# Patient Record
Sex: MALE | Race: WHITE | NOT HISPANIC OR LATINO
[De-identification: names, ages, dates, MRNs, and addresses within clinical notes are randomized per-mention and may not be internally consistent; named-entity substitution may affect disease eponyms.]

---

## 2018-08-07 ENCOUNTER — APPOINTMENT (OUTPATIENT)
Dept: GASTROENTEROLOGY | Facility: CLINIC | Age: 38
End: 2018-08-07
Payer: COMMERCIAL

## 2018-08-07 VITALS
SYSTOLIC BLOOD PRESSURE: 90 MMHG | TEMPERATURE: 98.1 F | DIASTOLIC BLOOD PRESSURE: 65 MMHG | BODY MASS INDEX: 17.58 KG/M2 | HEIGHT: 64 IN | WEIGHT: 103 LBS

## 2018-08-07 PROCEDURE — 99204 OFFICE O/P NEW MOD 45 MIN: CPT

## 2018-10-03 ENCOUNTER — APPOINTMENT (OUTPATIENT)
Dept: GASTROENTEROLOGY | Facility: CLINIC | Age: 38
End: 2018-10-03
Payer: COMMERCIAL

## 2018-10-03 VITALS
SYSTOLIC BLOOD PRESSURE: 110 MMHG | DIASTOLIC BLOOD PRESSURE: 70 MMHG | TEMPERATURE: 97.8 F | WEIGHT: 103 LBS | BODY MASS INDEX: 17.58 KG/M2 | HEIGHT: 64 IN

## 2018-10-03 DIAGNOSIS — R19.7 DIARRHEA, UNSPECIFIED: ICD-10-CM

## 2018-10-03 DIAGNOSIS — R10.9 UNSPECIFIED ABDOMINAL PAIN: ICD-10-CM

## 2018-10-03 PROCEDURE — 99214 OFFICE O/P EST MOD 30 MIN: CPT

## 2018-12-05 ENCOUNTER — APPOINTMENT (OUTPATIENT)
Dept: GASTROENTEROLOGY | Facility: CLINIC | Age: 38
End: 2018-12-05

## 2020-01-06 NOTE — REASON FOR VISIT
[New Patient Visit] : a new patient visit [Referred By: _________] : Patient was referred by JENNIFER

## 2020-01-07 ENCOUNTER — APPOINTMENT (OUTPATIENT)
Dept: SPINE | Facility: CLINIC | Age: 40
End: 2020-01-07

## 2020-08-17 ENCOUNTER — TRANSCRIPTION ENCOUNTER (OUTPATIENT)
Age: 40
End: 2020-08-17

## 2021-02-08 ENCOUNTER — NON-APPOINTMENT (OUTPATIENT)
Age: 41
End: 2021-02-08

## 2021-02-16 ENCOUNTER — NON-APPOINTMENT (OUTPATIENT)
Age: 41
End: 2021-02-16

## 2021-02-16 ENCOUNTER — APPOINTMENT (OUTPATIENT)
Dept: INTERNAL MEDICINE | Facility: CLINIC | Age: 41
End: 2021-02-16
Payer: COMMERCIAL

## 2021-02-16 VITALS
OXYGEN SATURATION: 97 % | SYSTOLIC BLOOD PRESSURE: 135 MMHG | BODY MASS INDEX: 19.97 KG/M2 | DIASTOLIC BLOOD PRESSURE: 86 MMHG | HEART RATE: 113 BPM | WEIGHT: 117 LBS | HEIGHT: 64 IN | TEMPERATURE: 97.5 F

## 2021-02-16 DIAGNOSIS — J98.19 OTHER PULMONARY COLLAPSE: ICD-10-CM

## 2021-02-16 PROCEDURE — 99072 ADDL SUPL MATRL&STAF TM PHE: CPT

## 2021-02-16 PROCEDURE — 99385 PREV VISIT NEW AGE 18-39: CPT

## 2021-02-16 RX ORDER — LIDOCAINE HYDROCHLORIDE 20 MG/ML
2 JELLY TOPICAL
Refills: 0 | Status: ACTIVE | COMMUNITY

## 2021-02-16 RX ORDER — ACETAMINOPHEN 325 MG/1
TABLET, FILM COATED ORAL
Refills: 0 | Status: ACTIVE | COMMUNITY

## 2021-02-16 RX ORDER — IMATINIB MESYLATE 400 MG/1
400 TABLET, FILM COATED ORAL
Refills: 0 | Status: ACTIVE | COMMUNITY

## 2021-02-17 NOTE — REVIEW OF SYSTEMS
[Dyspnea on Exertion] : dyspnea on exertion [Muscle Weakness] : muscle weakness [Unsteady Walk] : ataxia [Negative] : Genitourinary

## 2021-02-17 NOTE — PHYSICAL EXAM
[Cachectic] : cachexia was observed [Normal] : normal rate, regular rhythm, normal S1 and S2 and no murmur heard [No Varicosities] : no varicosities [Pedal Pulses Present] : the pedal pulses are present [No Edema] : there was no peripheral edema [No Extremity Clubbing/Cyanosis] : no extremity clubbing/cyanosis [Soft] : abdomen soft [Non Tender] : non-tender [Non-distended] : non-distended [Normal Bowel Sounds] : normal bowel sounds [Normal Posterior Cervical Nodes] : no posterior cervical lymphadenopathy [Normal Anterior Cervical Nodes] : no anterior cervical lymphadenopathy [No Rash] : no rash [Normal Affect] : the affect was normal [Normal Mood] : the mood was normal [de-identified] : Seen in wheelchair [de-identified] : Lordotic [de-identified] : Increased muscle atrophy noted in all extremities [de-identified] : Weakness appreciated on right lower extremity

## 2021-02-17 NOTE — ASSESSMENT
[FreeTextEntry1] : 40 year old male presents for initial annual exam.  \par Patient has extensive past medical history.\par \par Was diagnosed with severe scoliosis as a child which required surgery.  Was doing well until recently screws and back became loose and had to be revised for also further complication of developing diaphragmatic paralysis, restrictive lung disease.  recently had another revision with Dr. Gibbs, ortho spine.  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  recently has developed clonus in right leg.  Decreased strength, which she has been using wheelchair to get around.  \par -Advised to reach out to orthopedic surgery who performed the procedure, if no avail would recommend following up with neurosurgery specializes in spine for second opinion\par -Continue following up with pain management\par -Continue PT\par \par Advised to have specialist fax over old records to review\par \par restrictive lung disease, home o2 at night, Bipap, pulmonologist Dr. Neymar Chavez\par \par Annual \par -Advised to get yearly Flu shot \par -Advised Yearly Eye exam with Ophthalmologist\par -Advised Yearly Dental exam\par -Educated of the importance of Healthy diet, such as Mediterranean Diet and Exercise, such as walking >20 minutes a day and increasing gradually as tolerated\par

## 2021-02-17 NOTE — HEALTH RISK ASSESSMENT
[No] : No [No falls in past year] : Patient reported no falls in the past year [0] : 2) Feeling down, depressed, or hopeless: Not at all (0) [] : No [[id number]] : 0

## 2021-06-09 ENCOUNTER — APPOINTMENT (OUTPATIENT)
Dept: SPINE | Facility: CLINIC | Age: 41
End: 2021-06-09
Payer: COMMERCIAL

## 2021-06-09 VITALS
SYSTOLIC BLOOD PRESSURE: 111 MMHG | HEIGHT: 64 IN | RESPIRATION RATE: 18 BRPM | OXYGEN SATURATION: 97 % | WEIGHT: 115 LBS | BODY MASS INDEX: 19.63 KG/M2 | HEART RATE: 91 BPM | TEMPERATURE: 97.7 F | DIASTOLIC BLOOD PRESSURE: 75 MMHG

## 2021-06-09 DIAGNOSIS — Z98.890 OTHER SPECIFIED POSTPROCEDURAL STATES: ICD-10-CM

## 2021-06-09 PROCEDURE — 99205 OFFICE O/P NEW HI 60 MIN: CPT

## 2021-06-09 PROCEDURE — 99072 ADDL SUPL MATRL&STAF TM PHE: CPT

## 2021-10-22 ENCOUNTER — APPOINTMENT (OUTPATIENT)
Dept: INTERNAL MEDICINE | Facility: CLINIC | Age: 41
End: 2021-10-22
Payer: COMMERCIAL

## 2021-10-22 ENCOUNTER — LABORATORY RESULT (OUTPATIENT)
Age: 41
End: 2021-10-22

## 2021-10-22 VITALS
HEIGHT: 64 IN | OXYGEN SATURATION: 97 % | DIASTOLIC BLOOD PRESSURE: 79 MMHG | SYSTOLIC BLOOD PRESSURE: 112 MMHG | WEIGHT: 114 LBS | BODY MASS INDEX: 19.46 KG/M2 | HEART RATE: 99 BPM

## 2021-10-22 PROCEDURE — 99213 OFFICE O/P EST LOW 20 MIN: CPT

## 2021-10-22 RX ORDER — GABAPENTIN 400 MG
400 TABLET ORAL
Refills: 0 | Status: DISCONTINUED | COMMUNITY
End: 2021-10-22

## 2021-10-22 RX ORDER — DICYCLOMINE HYDROCHLORIDE 10 MG/1
10 CAPSULE ORAL EVERY 6 HOURS
Qty: 120 | Refills: 0 | Status: DISCONTINUED | COMMUNITY
Start: 2018-08-07 | End: 2021-10-22

## 2021-10-25 LAB
ALBUMIN SERPL ELPH-MCNC: 4.6 G/DL
ALP BLD-CCNC: 105 U/L
ALT SERPL-CCNC: 27 U/L
ANION GAP SERPL CALC-SCNC: 12 MMOL/L
AST SERPL-CCNC: 17 U/L
BASOPHILS # BLD AUTO: 0.01 K/UL
BASOPHILS NFR BLD AUTO: 0.1 %
BILIRUB DIRECT SERPL-MCNC: 0.1 MG/DL
BILIRUB INDIRECT SERPL-MCNC: 0.2 MG/DL
BILIRUB SERPL-MCNC: 0.3 MG/DL
BUN SERPL-MCNC: 13 MG/DL
CALCIUM SERPL-MCNC: 10.1 MG/DL
CHLORIDE SERPL-SCNC: 96 MMOL/L
CHOLEST SERPL-MCNC: 195 MG/DL
CO2 SERPL-SCNC: 32 MMOL/L
CREAT SERPL-MCNC: 0.61 MG/DL
EOSINOPHIL # BLD AUTO: 0 K/UL
EOSINOPHIL NFR BLD AUTO: 0 %
ESTIMATED AVERAGE GLUCOSE: 108 MG/DL
FOLATE SERPL-MCNC: 14 NG/ML
GLUCOSE SERPL-MCNC: 116 MG/DL
HBA1C MFR BLD HPLC: 5.4 %
HCT VFR BLD CALC: 44.3 %
HDLC SERPL-MCNC: 46 MG/DL
HGB BLD-MCNC: 14.2 G/DL
IMM GRANULOCYTES NFR BLD AUTO: 0.3 %
LDLC SERPL CALC-MCNC: 136 MG/DL
LYMPHOCYTES # BLD AUTO: 0.69 K/UL
LYMPHOCYTES NFR BLD AUTO: 9.7 %
MAN DIFF?: NORMAL
MCHC RBC-ENTMCNC: 31.1 PG
MCHC RBC-ENTMCNC: 32.1 GM/DL
MCV RBC AUTO: 97.1 FL
MONOCYTES # BLD AUTO: 0.46 K/UL
MONOCYTES NFR BLD AUTO: 6.5 %
NEUTROPHILS # BLD AUTO: 5.93 K/UL
NEUTROPHILS NFR BLD AUTO: 83.4 %
NONHDLC SERPL-MCNC: 150 MG/DL
PHOSPHATE SERPL-MCNC: 3.5 MG/DL
PLATELET # BLD AUTO: 351 K/UL
POTASSIUM SERPL-SCNC: 4.5 MMOL/L
PROT SERPL-MCNC: 6.4 G/DL
RBC # BLD: 4.56 M/UL
RBC # FLD: 14.4 %
SODIUM SERPL-SCNC: 140 MMOL/L
T3FREE SERPL-MCNC: 3.84 PG/ML
T4 FREE SERPL-MCNC: 1.2 NG/DL
TRIGL SERPL-MCNC: 68 MG/DL
TSH SERPL-ACNC: 0.82 UIU/ML
VIT B12 SERPL-MCNC: 437 PG/ML
WBC # FLD AUTO: 7.11 K/UL

## 2021-10-25 NOTE — PHYSICAL EXAM
[Cachectic] : cachexia was observed [Normal] : normal rate, regular rhythm, normal S1 and S2 and no murmur heard [No Varicosities] : no varicosities [Pedal Pulses Present] : the pedal pulses are present [No Edema] : there was no peripheral edema [No Extremity Clubbing/Cyanosis] : no extremity clubbing/cyanosis [Soft] : abdomen soft [Non Tender] : non-tender [Non-distended] : non-distended [Normal Bowel Sounds] : normal bowel sounds [Normal Posterior Cervical Nodes] : no posterior cervical lymphadenopathy [Normal Anterior Cervical Nodes] : no anterior cervical lymphadenopathy [No Rash] : no rash [Normal Affect] : the affect was normal [Normal Mood] : the mood was normal [de-identified] : Seen in wheelchair [de-identified] : Lordotic [de-identified] : Increased muscle atrophy noted in all extremities [de-identified] : Weakness appreciated on right lower extremity

## 2021-10-25 NOTE — HISTORY OF PRESENT ILLNESS
[de-identified] : 40 year old male presents for follow-up. accompanied with father \par \par interval HPI: still having acute pain and spasms, somewhat bettyer with lyrica.  spasms worsen off lyrica.  pain management recommended possible releif from medical marijuana.  \par \par HPI: Patient has extensive past medical history.\par Was diagnosed with severe scoliosis as a child which required surgery.  Was doing well until recently screws and back became loose and had to be revised for also further complication of developing diaphragmatic paralysis, restrictive lung disease.  recently had another revision with Dr. Gibbs, ortho spine.  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  \par recently has developed clonus in right leg.  Decreased strength, which she has been using wheelchair to get around.  \par \par restrictive lung disease, home o2 at night, Bipap \par pulmonologist Dr. Neymar Chavez

## 2021-10-25 NOTE — ASSESSMENT
[FreeTextEntry1] : 40 year old male presents for initial annual exam.  \par Patient has extensive past medical history.\par \par Was diagnosed with severe scoliosis as a child which required surgery.  Was doing well until recently screws and back became loose and had to be revised for also further complication of developing diaphragmatic paralysis, restrictive lung disease.  recently had another revision with Dr. Gibbs, ortho spine.  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  recently has developed clonus in right leg.  Decreased strength, which she has been using wheelchair to get around.  \par -Advised to reach out to orthopedic surgery who performed the procedure, if no avail would recommend following up with neurosurgery specializes in spine for second opinion\par -Continue following up with pain management\par -Continue PT\par -will certify fo medical marijuana and monitor for symptoms, risks and benefits of medication discussed in detail\par \par \par restrictive lung disease, home o2 at night, Bipap, pulmonologist Dr. Neymar Chavez-stable\par \par \par -Advised to get yearly Flu shot -refused today \par \par

## 2021-11-12 ENCOUNTER — APPOINTMENT (OUTPATIENT)
Dept: INTERNAL MEDICINE | Facility: CLINIC | Age: 41
End: 2021-11-12
Payer: COMMERCIAL

## 2021-11-12 DIAGNOSIS — R25.8 OTHER ABNORMAL INVOLUNTARY MOVEMENTS: ICD-10-CM

## 2021-11-12 PROCEDURE — 99213 OFFICE O/P EST LOW 20 MIN: CPT | Mod: 95

## 2021-11-12 NOTE — PHYSICAL EXAM
[de-identified] : Limited due to telehealth.  Speaking in complete sentences in no acute distress

## 2021-11-12 NOTE — HISTORY OF PRESENT ILLNESS
[Home] : at home, [unfilled] , at the time of the visit. [Medical Office: ([facility])___] : at the medical office located in  [Verbal consent obtained from patient] : the patient, [unfilled] [de-identified] : 40 year old male presents for follow-up.\par \par interval HPI: states is doing better with decreased dose of lyrica but would like to try lower dose as was taking 75 mg twice daily was getting some spasms in the middle of the day.  Has started CBD oil with some relief, concerned taking it systemically as can increase concentration of chemotherapy\par \par HPI: Patient has extensive past medical history.\par Was diagnosed with severe scoliosis as a child which required surgery.  Was doing well until recently screws and back became loose and had to be revised for also further complication of developing diaphragmatic paralysis, restrictive lung disease.  recently had another revision with Dr. Gibbs, ortho spine.  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  \par recently has developed clonus in right leg.  Decreased strength, which she has been using wheelchair to get around.  \par \par restrictive lung disease, home o2 at night, Bipap \par pulmonologist Dr. Neymar Chavez

## 2021-11-12 NOTE — ASSESSMENT
[FreeTextEntry1] : 40 year old male presents for follow-up \par Patient has extensive past medical history.\par \par Was diagnosed with severe scoliosis as a child which required surgery.  Was doing well until recently screws and back became loose and had to be revised for also further complication of developing diaphragmatic paralysis, restrictive lung disease.  recently had another revision with Dr. Gibbs, ortho spine.  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  recently has developed clonus in right leg.  Decreased strength, which she has been using wheelchair to get around.  \par -We will try Lyrica 50 mg 3 times daily and monitor for response\par -cont medical marijuana and monitor for symptoms, advised to discuss with [facility] regarding possible systemic ingestion as can have better effect on muscle spasms.\par \par \par restrictive lung disease, home o2 at night, Bipap, pulmonologist Dr. Neymar Chavez-stable\par \par \par -Advised to get yearly Flu shot -refused today \par \par

## 2022-02-14 ENCOUNTER — APPOINTMENT (OUTPATIENT)
Dept: INTERNAL MEDICINE | Facility: CLINIC | Age: 42
End: 2022-02-14
Payer: COMMERCIAL

## 2022-02-14 PROCEDURE — 99213 OFFICE O/P EST LOW 20 MIN: CPT | Mod: 95

## 2022-02-14 RX ORDER — PREGABALIN 50 MG/1
50 CAPSULE ORAL
Qty: 270 | Refills: 1 | Status: ACTIVE | COMMUNITY
Start: 1900-01-01 | End: 1900-01-01

## 2022-02-14 NOTE — PHYSICAL EXAM
[de-identified] : Limited due to telehealth.  Speaking in complete sentences in no acute distress
The patient is a 58y Male complaining of sinus congestion/pain.

## 2022-02-14 NOTE — HISTORY OF PRESENT ILLNESS
[de-identified] : 41 year old male presents for follow-up.\par \par interval HPI: Requesting refill for Lyrica. Has started CBD oil with some relief, concerned taking it systemically as can increase concentration of chemotherapy\par \par HPI: Patient has extensive past medical history.  Never\par Was diagnosed with severe scoliosis as a child which required surgery.  Was doing well until recently screws and back became loose and had to be revised for also further complication of developing diaphragmatic paralysis, restrictive lung disease.  recently had another revision with Dr. Gibbs, ortho spine.  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  \par recently has developed clonus in right leg.  Decreased strength, which she has been using wheelchair to get around.  \par \par restrictive lung disease, home o2 at night, Bipap \par pulmonologist Dr. Neymar Chavez

## 2022-02-14 NOTE — ASSESSMENT
[FreeTextEntry1] : 40 year old male presents for follow-up \par Patient has extensive past medical history.\par \par Was diagnosed with severe scoliosis as a child which required surgery.  Was doing well until recently screws and back became loose and had to be revised for also further complication of developing diaphragmatic paralysis, restrictive lung disease.  recently had another revision with Dr. Gibbs, ortho spine.  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  recently has developed clonus in right leg.  Decreased strength, which she has been using wheelchair to get around.  \par -We will try Lyrica 50 mg 3 times daily and monitor for response\par -cont medical marijuana and monitor for symptoms, advised to discuss with [facility] regarding possible systemic ingestion as can have better effect on muscle spasms.\par \par \par restrictive lung disease, home o2 at night, Bipap, pulmonologist Dr. Neymar Chavez-stable\par \par \par -Advised to get yearly Flu shot -refused today \par \par

## 2023-01-09 ENCOUNTER — APPOINTMENT (OUTPATIENT)
Dept: INTERNAL MEDICINE | Facility: CLINIC | Age: 43
End: 2023-01-09
Payer: COMMERCIAL

## 2023-01-09 ENCOUNTER — APPOINTMENT (OUTPATIENT)
Dept: INTERNAL MEDICINE | Facility: CLINIC | Age: 43
End: 2023-01-09

## 2023-01-09 VITALS
HEART RATE: 108 BPM | HEIGHT: 64 IN | BODY MASS INDEX: 18.44 KG/M2 | DIASTOLIC BLOOD PRESSURE: 73 MMHG | WEIGHT: 108 LBS | SYSTOLIC BLOOD PRESSURE: 117 MMHG

## 2023-01-09 DIAGNOSIS — M54.6 PAIN IN THORACIC SPINE: ICD-10-CM

## 2023-01-09 DIAGNOSIS — M54.9 DORSALGIA, UNSPECIFIED: ICD-10-CM

## 2023-01-09 DIAGNOSIS — G89.29 DORSALGIA, UNSPECIFIED: ICD-10-CM

## 2023-01-09 DIAGNOSIS — M41.9 SCOLIOSIS, UNSPECIFIED: ICD-10-CM

## 2023-01-09 PROCEDURE — 99214 OFFICE O/P EST MOD 30 MIN: CPT

## 2023-01-09 NOTE — PHYSICAL EXAM
[Cachectic] : cachexia was observed [Normal] : normal rate, regular rhythm, normal S1 and S2 and no murmur heard [No Varicosities] : no varicosities [Pedal Pulses Present] : the pedal pulses are present [No Edema] : there was no peripheral edema [No Extremity Clubbing/Cyanosis] : no extremity clubbing/cyanosis [Soft] : abdomen soft [Non Tender] : non-tender [Non-distended] : non-distended [Normal Bowel Sounds] : normal bowel sounds [Normal Posterior Cervical Nodes] : no posterior cervical lymphadenopathy [Normal Anterior Cervical Nodes] : no anterior cervical lymphadenopathy [No Rash] : no rash [Normal Affect] : the affect was normal [Normal Mood] : the mood was normal [de-identified] : Seen in wheelchair [de-identified] : Lordotic [de-identified] : Increased muscle atrophy noted in all extremities [de-identified] : Weakness appreciated on right lower extremity

## 2023-01-09 NOTE — REVIEW OF SYSTEMS
[Muscle Weakness] : muscle weakness [Skin Rash] : skin rash [Unsteady Walk] : ataxia [Negative] : Genitourinary

## 2023-01-09 NOTE — ASSESSMENT
[FreeTextEntry1] : 42 year old male presents for follow-up \par Patient has extensive past medical history.\par \par abnormal urine, u/a-wnl.  can be rleated to passing stone \par -General recommendations include fluid intake, mainly water, to produce about 2.5 liters of urine per day. Calcium intake should be approximately 1000 mg per day. Oxalate intake should be minimized. Oxalate rich foods include peanuts, nuts, tea, coffee, chocolate, spinach, beets, rhubarb, swiss chard, etc. Salt intake should be limited to less than 2000 mg per day. High levels of salt in diet will increase urinary calcium. Citrate should be increased with bhupendra and limes or adding concentrate to water. \par \par \par Was diagnosed with severe scoliosis as a child which required surgery.  Was doing well until recently screws and back became loose and had to be revised for also further complication of developing diaphragmatic paralysis, restrictive lung disease.  recently had another revision with Dr. Gibbs, ortho spine.  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  recently has developed clonus in right leg.  Decreased strength, which she has been using wheelchair to get around.  \par -f/u as per [facility] \par \par chronic back pain \par -recommended acupuncture, cont PT with aqua \par \par restrictive lung disease, home o2 at night, Bipap, pulmonologist Dr. Neymar Chavez-stable\par \par -Advised to get yearly Flu shot -refused today \par \par f/u for annual later this month \par \par Please note that 36 minutes time spent in care with this patient which includes pre-visit preparation such as reviewed pertinent labs, imaging, and specialists consultation, in-person visit, post-visit documentation and discussion.\par

## 2023-01-09 NOTE — HISTORY OF PRESENT ILLNESS
[de-identified] : 42 year old male presents for follow-up.\par \par interval HPI: \par going to aqua therapy which helps somewhat with back pain \par follows with pain management \par kidney lesion noted on CT s/p bx 6/2022.  bx was unsatisfactory.  f/u CT shows smaller in size.   \par urinalysis shows some protein.  was seen at [facility] and labs and urinalysis was normal last week.  \par \par \par HPI: Patient has extensive past medical history.  Never\par Was diagnosed with severe scoliosis as a child which required surgery.  Was doing well until recently screws and back became loose and had to be revised for also further complication of developing diaphragmatic paralysis, restrictive lung disease.  recently had another revision with Dr. Gibbs, ortho spine.  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  \par recently has developed clonus in right leg.  Decreased strength, which she has been using wheelchair to get around.  \par \par restrictive lung disease, home o2 at night, Bipap \par pulmonologist Dr. Neymar Chavez

## 2023-01-10 ENCOUNTER — CLINICAL ADVICE (OUTPATIENT)
Age: 43
End: 2023-01-10

## 2023-01-10 LAB
APPEARANCE: CLEAR
BACTERIA: NEGATIVE
BILIRUBIN URINE: NEGATIVE
BLOOD URINE: NEGATIVE
COLOR: YELLOW
GLUCOSE QUALITATIVE U: NEGATIVE
HYALINE CASTS: 1 /LPF
KETONES URINE: NEGATIVE
LEUKOCYTE ESTERASE URINE: NEGATIVE
MICROSCOPIC-UA: NORMAL
NITRITE URINE: NEGATIVE
PH URINE: 7.5
PROTEIN URINE: ABNORMAL
RED BLOOD CELLS URINE: 8 /HPF
SPECIFIC GRAVITY URINE: 1.02
SQUAMOUS EPITHELIAL CELLS: 1 /HPF
UROBILINOGEN URINE: ABNORMAL
WHITE BLOOD CELLS URINE: 1 /HPF

## 2023-01-24 ENCOUNTER — LABORATORY RESULT (OUTPATIENT)
Age: 43
End: 2023-01-24

## 2023-01-24 ENCOUNTER — NON-APPOINTMENT (OUTPATIENT)
Age: 43
End: 2023-01-24

## 2023-01-24 ENCOUNTER — APPOINTMENT (OUTPATIENT)
Dept: INTERNAL MEDICINE | Facility: CLINIC | Age: 43
End: 2023-01-24
Payer: MEDICARE

## 2023-01-24 VITALS
WEIGHT: 106 LBS | SYSTOLIC BLOOD PRESSURE: 126 MMHG | HEIGHT: 64 IN | BODY MASS INDEX: 18.1 KG/M2 | DIASTOLIC BLOOD PRESSURE: 81 MMHG | HEART RATE: 96 BPM

## 2023-01-24 PROCEDURE — 36415 COLL VENOUS BLD VENIPUNCTURE: CPT

## 2023-01-24 PROCEDURE — 93000 ELECTROCARDIOGRAM COMPLETE: CPT | Mod: 59

## 2023-01-24 PROCEDURE — G0403: CPT

## 2023-01-24 PROCEDURE — G0402 INITIAL PREVENTIVE EXAM: CPT

## 2023-01-24 PROCEDURE — G0439: CPT

## 2023-01-24 NOTE — HISTORY OF PRESENT ILLNESS
[de-identified] : 42 year old male in wheelchair with h/o h/o multiple back surgeries, GIST tumor, restrictive lung disease presents for annual exam\par \par overall doing ok.  no further hematuria \par \par 1/9/2023-\par going to aqua therapy which helps somewhat with back pain \par follows with pain management \par kidney lesion noted on CT s/p bx 6/2022.  bx was unsatisfactory.  f/u CT shows smaller in size.   \par urinalysis shows some protein.  [facility] seen at [facility] and labs and urinalysis was normal last week.  \par \par \par HPI: Patient has extensive past medical history.  \par Was diagnosed with severe scoliosis as a child which required surgery.  Was doing well until recently screws and back became loose and had to be revised for also further complication of developing diaphragmatic paralysis, restrictive lung disease.  recently had another revision with Dr. Gibbs, ortho spine.  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  \par recently has developed clonus in right leg.  Decreased strength, which she has been using wheelchair to get around.  \par \par restrictive lung disease, home o2 at night, Bipap \par pulmonologist Dr. Neymar Chavez

## 2023-01-24 NOTE — ASSESSMENT
[FreeTextEntry1] : 42 year old male presents for annual exam \par Patient has extensive past medical history.\par \par abnormal urine, u/a-wnl.  can be related to passing stone -resolved \par -General recommendations include fluid intake, mainly water, to produce about 2.5 liters of urine per day. Calcium intake should be approximately 1000 mg per day. Oxalate intake should be minimized. Oxalate rich foods include peanuts, nuts, tea, coffee, chocolate, spinach, beets, rhubarb, swiss chard, etc. Salt intake should be limited to less than 2000 mg per day. High levels of salt in diet will increase urinary calcium. Citrate should be increased with bhupendra and limes or adding concentrate to water. \par \par \par Was diagnosed with severe scoliosis as a child which required surgery.  Was doing well until recently screws and back became loose and had to be revised for also further complication of developing diaphragmatic paralysis, restrictive lung disease.  recently had another revision with Dr. Gibbs, ortho spine.  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  recently has developed clonus in right leg.  Decreased strength, which she has been using wheelchair to get around.  \par -f/u as per [facility] \par \par chronic back pain \par -recommended acupuncture, cont PT with aqua \par \par restrictive lung disease, home o2 at night, Bipap, pulmonologist Dr. Neymar Chavez-stable\par \par Annual \par -Advise to get yearly Flu shot-refusing \par -Advise Yearly Skin cancer screening with Dermatologist \par -Advise Yearly Eye exam with Ophthalmologist\par -Advise Yearly Dental exam\par -Educated of the importance of Healthy diet, such as Mediterranean Diet and Exercise, such as walking >20 minutes a day and increasing gradually as tolerated\par \par \par \par \par

## 2023-01-24 NOTE — HEALTH RISK ASSESSMENT
[Fair] :  ~his/her~ mood as fair [Never] : Never [No falls in past year] : Patient reported no falls in the past year [0] : 2) Feeling down, depressed, or hopeless: Not at all (0) [PHQ-2 Negative - No further assessment needed] : PHQ-2 Negative - No further assessment needed [[id number]] : 0 [Change in mental status noted] : No change in mental status noted

## 2023-01-24 NOTE — PHYSICAL EXAM
[Cachectic] : cachexia was observed [Normal] : normal rate, regular rhythm, normal S1 and S2 and no murmur heard [No Varicosities] : no varicosities [Pedal Pulses Present] : the pedal pulses are present [No Edema] : there was no peripheral edema [No Extremity Clubbing/Cyanosis] : no extremity clubbing/cyanosis [Soft] : abdomen soft [Non Tender] : non-tender [Non-distended] : non-distended [Normal Bowel Sounds] : normal bowel sounds [Normal Posterior Cervical Nodes] : no posterior cervical lymphadenopathy [Normal Anterior Cervical Nodes] : no anterior cervical lymphadenopathy [No Rash] : no rash [Normal Affect] : the affect was normal [Normal Mood] : the mood was normal [de-identified] : Seen in wheelchair [de-identified] : not performed as wearing mask due to covid precautions [de-identified] : Lordotic [de-identified] : Increased muscle atrophy noted in all extremities [de-identified] : Weakness appreciated on right lower extremity

## 2023-01-27 ENCOUNTER — CLINICAL ADVICE (OUTPATIENT)
Age: 43
End: 2023-01-27

## 2023-01-27 LAB
25(OH)D3 SERPL-MCNC: 47.8 NG/ML
ALBUMIN SERPL ELPH-MCNC: 4.4 G/DL
ALP BLD-CCNC: 81 U/L
ALT SERPL-CCNC: 22 U/L
ANION GAP SERPL CALC-SCNC: 11 MMOL/L
APPEARANCE: CLEAR
AST SERPL-CCNC: 21 U/L
BASOPHILS # BLD AUTO: 0.06 K/UL
BASOPHILS NFR BLD AUTO: 1.1 %
BILIRUB DIRECT SERPL-MCNC: 0.1 MG/DL
BILIRUB INDIRECT SERPL-MCNC: 0.2 MG/DL
BILIRUB SERPL-MCNC: 0.3 MG/DL
BILIRUBIN URINE: NEGATIVE
BLOOD URINE: NEGATIVE
BUN SERPL-MCNC: 8 MG/DL
CALCIUM SERPL-MCNC: 9.4 MG/DL
CHLORIDE SERPL-SCNC: 94 MMOL/L
CHOLEST SERPL-MCNC: 189 MG/DL
CO2 SERPL-SCNC: 32 MMOL/L
COLOR: YELLOW
CREAT SERPL-MCNC: 0.63 MG/DL
EGFR: 123 ML/MIN/1.73M2
EOSINOPHIL # BLD AUTO: 0.66 K/UL
EOSINOPHIL NFR BLD AUTO: 11.7 %
ESTIMATED AVERAGE GLUCOSE: 105 MG/DL
FERRITIN SERPL-MCNC: 31 NG/ML
FOLATE SERPL-MCNC: 15.6 NG/ML
GLUCOSE QUALITATIVE U: NEGATIVE
GLUCOSE SERPL-MCNC: 88 MG/DL
HBA1C MFR BLD HPLC: 5.3 %
HCT VFR BLD CALC: 39.2 %
HDLC SERPL-MCNC: 45 MG/DL
HGB BLD-MCNC: 12.6 G/DL
IMM GRANULOCYTES NFR BLD AUTO: 0.2 %
IRON SATN MFR SERPL: 29 %
IRON SERPL-MCNC: 90 UG/DL
KETONES URINE: NEGATIVE
LDLC SERPL CALC-MCNC: 126 MG/DL
LEUKOCYTE ESTERASE URINE: NEGATIVE
LYMPHOCYTES # BLD AUTO: 0.94 K/UL
LYMPHOCYTES NFR BLD AUTO: 16.7 %
MAN DIFF?: NORMAL
MCHC RBC-ENTMCNC: 32.1 GM/DL
MCHC RBC-ENTMCNC: 32.2 PG
MCV RBC AUTO: 100.3 FL
MONOCYTES # BLD AUTO: 0.38 K/UL
MONOCYTES NFR BLD AUTO: 6.7 %
NEUTROPHILS # BLD AUTO: 3.58 K/UL
NEUTROPHILS NFR BLD AUTO: 63.6 %
NITRITE URINE: NEGATIVE
NONHDLC SERPL-MCNC: 144 MG/DL
PH URINE: 7
PLATELET # BLD AUTO: 353 K/UL
POTASSIUM SERPL-SCNC: 4.1 MMOL/L
PROT SERPL-MCNC: 6.1 G/DL
PROTEIN URINE: ABNORMAL
RBC # BLD: 3.91 M/UL
RBC # FLD: 13.5 %
SODIUM SERPL-SCNC: 137 MMOL/L
SPECIFIC GRAVITY URINE: 1.02
TESTOST SERPL-MCNC: 1017 NG/DL
TIBC SERPL-MCNC: 305 UG/DL
TRIGL SERPL-MCNC: 88 MG/DL
TSH SERPL-ACNC: 0.9 UIU/ML
UIBC SERPL-MCNC: 215 UG/DL
UROBILINOGEN URINE: NORMAL
VIT B12 SERPL-MCNC: 400 PG/ML
WBC # FLD AUTO: 5.63 K/UL

## 2023-01-30 ENCOUNTER — NON-APPOINTMENT (OUTPATIENT)
Age: 43
End: 2023-01-30

## 2023-01-30 ENCOUNTER — CLINICAL ADVICE (OUTPATIENT)
Age: 43
End: 2023-01-30

## 2023-01-30 LAB
PSA SERPL-MCNC: 2.51 NG/ML
VIT B1 SERPL-MCNC: 89.3 NMOL/L
VIT B6 SERPL-MCNC: 2.5 UG/L

## 2024-05-01 ENCOUNTER — NON-APPOINTMENT (OUTPATIENT)
Age: 44
End: 2024-05-01

## 2024-05-01 ENCOUNTER — APPOINTMENT (OUTPATIENT)
Dept: INTERNAL MEDICINE | Facility: CLINIC | Age: 44
End: 2024-05-01
Payer: MEDICARE

## 2024-05-01 DIAGNOSIS — Z98.890 OTHER SPECIFIED POSTPROCEDURAL STATES: ICD-10-CM

## 2024-05-01 DIAGNOSIS — Z00.00 ENCOUNTER FOR GENERAL ADULT MEDICAL EXAMINATION W/OUT ABNORMAL FINDINGS: ICD-10-CM

## 2024-05-01 DIAGNOSIS — J98.4 OTHER DISORDERS OF LUNG: ICD-10-CM

## 2024-05-01 DIAGNOSIS — C49.A0 GASTROINTESTINAL STOMACL TUMOR,UNSPECIFIED SITE: ICD-10-CM

## 2024-05-01 DIAGNOSIS — J98.6 DISORDERS OF DIAPHRAGM: ICD-10-CM

## 2024-05-01 DIAGNOSIS — Z86.69 PERSONAL HISTORY OF OTHER DISEASES OF THE NERVOUS SYSTEM AND SENSE ORGANS: ICD-10-CM

## 2024-05-01 DIAGNOSIS — K59.00 CONSTIPATION, UNSPECIFIED: ICD-10-CM

## 2024-05-01 PROCEDURE — 93000 ELECTROCARDIOGRAM COMPLETE: CPT

## 2024-05-01 PROCEDURE — 36415 COLL VENOUS BLD VENIPUNCTURE: CPT

## 2024-05-01 PROCEDURE — G0439: CPT

## 2024-05-01 NOTE — PHYSICAL EXAM
[Cachectic] : cachexia was observed [Normal] : normal rate, regular rhythm, normal S1 and S2 and no murmur heard [Pedal Pulses Present] : the pedal pulses are present [No Edema] : there was no peripheral edema [No Extremity Clubbing/Cyanosis] : no extremity clubbing/cyanosis [Soft] : abdomen soft [Non Tender] : non-tender [Non-distended] : non-distended [No HSM] : no HSM [Normal Posterior Cervical Nodes] : no posterior cervical lymphadenopathy [Normal Anterior Cervical Nodes] : no anterior cervical lymphadenopathy [No Rash] : no rash [Normal Affect] : the affect was normal [Normal Mood] : the mood was normal [de-identified] : Seen in wheelchair [de-identified] : Lordotic [de-identified] : Increased muscle atrophy noted in all extremities [de-identified] : Weakness appreciated on right lower extremity

## 2024-05-01 NOTE — ASSESSMENT
[FreeTextEntry1] : //  Constipation, multifactorial including decrease fluids, pain meds annd wheelchair bound -Maintain a high-fiber diet (20-35 g/day; primarily through natural fiber found in fruits and vegetables) and adequate fluid intake (6-8 8oz. glasses per day).  -Psyllium (Metamucil) or Methylcellulose (Citrucel) are over-the-counter supplements which may be added to meet dietary fiber goals.  -Miralax, daily  -Do not ignore the urge to have a bowel movement as this can lead to a decrease in overall urge, resulting in worsening constipation.  -f/u with GI for colonoscopy, info given   Was diagnosed with severe scoliosis as a child which required surgery.  Was doing well until recently screws and back became loose and had to be revised for also further complication of developing diaphragmatic paralysis, restrictive lung disease.  recently had another revision with Dr. Gibbs, ortho spine.  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  recently has developed clonus in right leg.  Decreased strength, which she has been using wheelchair to get around.   -f/u as per [facility]   chronic back pain  -recommended acupuncture, cont PT with aqua   restrictive lung disease, home 1L o2 at night, Bipap, pulmonologist Dr. Neymar Chavez-stable  Annual  -Advise to get yearly Flu shot -Advise Yearly Skin cancer screening with Dermatologist  -Advise Yearly Eye exam with Ophthalmologist -Advise Yearly Dental exam -Educated of the importance of Healthy diet, such as Mediterranean Diet and Exercise, such as walking >20 minutes a day and increasing gradually as tolerated  Preventative screening  -advised to get colonoscopy for colon cancer screening -referral given  -will check PSA for prostate cancer screening -labs drawn

## 2024-05-01 NOTE — HEALTH RISK ASSESSMENT
[Fair] :  ~his/her~ mood as fair [No falls in past year] : Patient reported no falls in the past year [0] : 2) Feeling down, depressed, or hopeless: Not at all (0) [PHQ-2 Negative - No further assessment needed] : PHQ-2 Negative - No further assessment needed [[id number]] : 0 [Change in mental status noted] : No change in mental status noted [ColonoscopyDate] : never [Never] : Never

## 2024-05-06 ENCOUNTER — CLINICAL ADVICE (OUTPATIENT)
Age: 44
End: 2024-05-06

## 2024-05-06 LAB
25(OH)D3 SERPL-MCNC: 34.7 NG/ML
ALBUMIN SERPL ELPH-MCNC: 4.6 G/DL
ALP BLD-CCNC: 84 U/L
ALT SERPL-CCNC: 15 U/L
ANION GAP SERPL CALC-SCNC: 13 MMOL/L
APPEARANCE: ABNORMAL
AST SERPL-CCNC: 19 U/L
BACTERIA: NEGATIVE /HPF
BASOPHILS # BLD AUTO: 0.06 K/UL
BASOPHILS NFR BLD AUTO: 1 %
BILIRUB DIRECT SERPL-MCNC: 0.1 MG/DL
BILIRUB INDIRECT SERPL-MCNC: 0.3 MG/DL
BILIRUB SERPL-MCNC: 0.4 MG/DL
BILIRUBIN URINE: NEGATIVE
BLOOD URINE: NEGATIVE
BUN SERPL-MCNC: 9 MG/DL
CALCIUM SERPL-MCNC: 9.6 MG/DL
CAST: 0 /LPF
CHLORIDE SERPL-SCNC: 93 MMOL/L
CHOLEST SERPL-MCNC: 178 MG/DL
CO2 SERPL-SCNC: 32 MMOL/L
COLOR: YELLOW
CREAT SERPL-MCNC: 0.58 MG/DL
EGFR: 126 ML/MIN/1.73M2
EOSINOPHIL # BLD AUTO: 0.75 K/UL
EOSINOPHIL NFR BLD AUTO: 12.4 %
EPITHELIAL CELLS: 0 /HPF
ESTIMATED AVERAGE GLUCOSE: 108 MG/DL
FERRITIN SERPL-MCNC: 26 NG/ML
FOLATE SERPL-MCNC: 16 NG/ML
GLUCOSE QUALITATIVE U: NEGATIVE MG/DL
GLUCOSE SERPL-MCNC: 95 MG/DL
HBA1C MFR BLD HPLC: 5.4 %
HCT VFR BLD CALC: 40 %
HDLC SERPL-MCNC: 48 MG/DL
HGB BLD-MCNC: 12.7 G/DL
IMM GRANULOCYTES NFR BLD AUTO: 0.2 %
IRON SATN MFR SERPL: 40 %
IRON SERPL-MCNC: 129 UG/DL
KETONES URINE: ABNORMAL MG/DL
LDLC SERPL CALC-MCNC: 114 MG/DL
LEUKOCYTE ESTERASE URINE: ABNORMAL
LYMPHOCYTES # BLD AUTO: 0.94 K/UL
LYMPHOCYTES NFR BLD AUTO: 15.5 %
MAN DIFF?: NORMAL
MCHC RBC-ENTMCNC: 29.6 PG
MCHC RBC-ENTMCNC: 31.8 GM/DL
MCV RBC AUTO: 93.2 FL
MICROSCOPIC-UA: NORMAL
MONOCYTES # BLD AUTO: 0.51 K/UL
MONOCYTES NFR BLD AUTO: 8.4 %
NEUTROPHILS # BLD AUTO: 3.78 K/UL
NEUTROPHILS NFR BLD AUTO: 62.5 %
NITRITE URINE: NEGATIVE
NONHDLC SERPL-MCNC: 129 MG/DL
PH URINE: 8.5
PLATELET # BLD AUTO: 322 K/UL
POTASSIUM SERPL-SCNC: 4.6 MMOL/L
PROT SERPL-MCNC: 6.6 G/DL
PROTEIN URINE: NORMAL MG/DL
PSA SERPL-MCNC: 1.38 NG/ML
RBC # BLD: 4.29 M/UL
RBC # FLD: 13.3 %
RED BLOOD CELLS URINE: 4 /HPF
SODIUM SERPL-SCNC: 138 MMOL/L
SPECIFIC GRAVITY URINE: 1.02
TIBC SERPL-MCNC: 320 UG/DL
TRIGL SERPL-MCNC: 82 MG/DL
TSH SERPL-ACNC: 0.91 UIU/ML
UIBC SERPL-MCNC: 191 UG/DL
UROBILINOGEN URINE: 1 MG/DL
VIT B12 SERPL-MCNC: 437 PG/ML
WBC # FLD AUTO: 6.05 K/UL
WHITE BLOOD CELLS URINE: 0 /HPF

## 2024-07-31 ENCOUNTER — APPOINTMENT (OUTPATIENT)
Dept: GASTROENTEROLOGY | Facility: CLINIC | Age: 44
End: 2024-07-31
Payer: MEDICARE

## 2024-07-31 VITALS
SYSTOLIC BLOOD PRESSURE: 100 MMHG | HEART RATE: 91 BPM | WEIGHT: 97 LBS | BODY MASS INDEX: 16.56 KG/M2 | DIASTOLIC BLOOD PRESSURE: 62 MMHG | HEIGHT: 64 IN

## 2024-07-31 DIAGNOSIS — D72.10 EOSINOPHILIA, UNSPECIFIED: ICD-10-CM

## 2024-07-31 DIAGNOSIS — R19.8 OTHER SPECIFIED SYMPTOMS AND SIGNS INVOLVING THE DIGESTIVE SYSTEM AND ABDOMEN: ICD-10-CM

## 2024-07-31 DIAGNOSIS — R19.4 CHANGE IN BOWEL HABIT: ICD-10-CM

## 2024-07-31 DIAGNOSIS — D50.9 IRON DEFICIENCY ANEMIA, UNSPECIFIED: ICD-10-CM

## 2024-07-31 DIAGNOSIS — C49.A0 GASTROINTESTINAL STOMACL TUMOR,UNSPECIFIED SITE: ICD-10-CM

## 2024-07-31 DIAGNOSIS — R19.7 DIARRHEA, UNSPECIFIED: ICD-10-CM

## 2024-07-31 PROCEDURE — 82272 OCCULT BLD FECES 1-3 TESTS: CPT

## 2024-07-31 PROCEDURE — 99205 OFFICE O/P NEW HI 60 MIN: CPT | Mod: 25

## 2024-07-31 PROCEDURE — 36415 COLL VENOUS BLD VENIPUNCTURE: CPT | Mod: 59

## 2024-07-31 NOTE — PHYSICAL EXAM
[Alert] : alert [Normal Voice/Communication] : normal voice/communication [No Acute Distress] : no acute distress [Well Developed] : well developed [Underweight (BMI <= 18.5)] : underweight (BMI <= 18.5) [None] : no edema [Bowel Sounds] : normal bowel sounds [Abdomen Tenderness] : non-tender [No Masses] : no abdominal mass palpated [Abdomen Soft] : soft [] : no hepatosplenomegaly [No Hernia] : no hernia [No External Hemorrhoid] : no external hemorrhoids [Inguinal Lymph Nodes Enlarged Bilaterally] : no inguinal lymphadenopathy [Normal Color / Pigmentation] : normal skin color and pigmentation [Normal] : oriented to person, place, and time [Occult Blood] : negative occult blood [de-identified] : surgical scar [de-identified] : surgical scars

## 2024-07-31 NOTE — HISTORY OF PRESENT ILLNESS
[FreeTextEntry1] : Escobar presents with altered bowels, abdominal pain, anorectal burning, and mild iron deficiency anemia (last Hgb 12.7/Hct 40, Ferritin 26, mild eosinophilia).  He had undergone partial gastrectomy for GIST in 2020 (he mentioned that diaphragm repair and possibly pancreatic tail was necessary intraoperatively), now on Gleevec.  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  He has also undergone multiple back surgeries, often in wheelchair as a result.  He has had intermittent left sided mid-abdominal pain, variably relieved by BM.  Bowels have intermittently been erratic, with alternating diarrhea and constipation.  He would definitely experience diarrhea after ingesting meat or peanut butter.  However, in the past month, he has had more predictable liquidy or soft stool, last "normal" BM approximately one month ago, with anorectal burning.  He may have seen some blood on wiping from time to time.  When he tried more fiber, he felt worse.  He thinks he might have felt somewhat better with flaxseeds.  He is chronically underweight but thinks he may have lost a few pounds recently. Last CT scan of chest/abdomen/pelvis in March 2024 revealed unchanged right paraspinal mass, but no obvious intra-abdominal pathology.

## 2024-07-31 NOTE — CONSULT LETTER
[Dear  ___] : Dear  [unfilled], [Consult Letter:] : I had the pleasure of evaluating your patient, [unfilled]. [Please see my note below.] : Please see my note below. [Consult Closing:] : Thank you very much for allowing me to participate in the care of this patient.  If you have any questions, please do not hesitate to contact me. [Sincerely,] : Sincerely, [FreeTextEntry3] : Inocencio Flanagan M.D.

## 2024-07-31 NOTE — HISTORY OF PRESENT ILLNESS
[FreeTextEntry1] : Escobar presents with altered bowels, abdominal pain, anorectal burning, and mild iron deficiency anemia (last Hgb 12.7/Hct 40, Ferritin 26, mild eosinophilia).  He had undergone partial gastrectomy for GIST in 2020 (he mentioned that diaphragm repair and possibly pancreatic tail was necessary intraoperatively), now on Gleevec.  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  He has also undergone multiple back surgeries, often in wheelchair as a result.  He has had intermittent left sided mid-abdominal pain, variably relieved by BM.  Bowels have intermittently been erratic, with alternating diarrhea and constipation.  He would definitely experience diarrhea after ingesting meat or peanut butter.  However, in the past month, he has had more predictable liquidy or soft stool, last "normal" BM approximately one month ago, with anorectal burning.  He may have seen some blood on wiping from time to time.  When he tried more fiber, he felt worse.  He thinks he might have felt somewhat better with flaxseeds.  He is chronically underweight but thinks he may have lost a few pounds recently. Last CT scan of chest/abdomen/pelvis in March 2024 revealed unchanged right paraspinal mass, but no obvious intra-abdominal pathology.

## 2024-07-31 NOTE — REASON FOR VISIT
[Consultation] : a consultation visit [Other: _____] : [unfilled] [FreeTextEntry1] : stomach pain, diarrhea

## 2024-07-31 NOTE — PHYSICAL EXAM
[Alert] : alert [Normal Voice/Communication] : normal voice/communication [No Acute Distress] : no acute distress [Well Developed] : well developed [Underweight (BMI <= 18.5)] : underweight (BMI <= 18.5) [None] : no edema [Bowel Sounds] : normal bowel sounds [Abdomen Tenderness] : non-tender [No Masses] : no abdominal mass palpated [Abdomen Soft] : soft [] : no hepatosplenomegaly [No Hernia] : no hernia [No External Hemorrhoid] : no external hemorrhoids [Inguinal Lymph Nodes Enlarged Bilaterally] : no inguinal lymphadenopathy [Normal Color / Pigmentation] : normal skin color and pigmentation [Normal] : oriented to person, place, and time [Occult Blood] : negative occult blood [de-identified] : surgical scar [de-identified] : surgical scars

## 2024-07-31 NOTE — REVIEW OF SYSTEMS
[Shortness Of Breath] : shortness of breath [Abdominal Pain] : abdominal pain [Diarrhea] : diarrhea [Difficulty Walking] : difficulty walking [Muscle Weakness] : muscle weakness [Negative] : Heme/Lymph

## 2024-07-31 NOTE — ASSESSMENT
[FreeTextEntry1] : 1.  Change in bowels with recent diarrhea, mild weight loss, mild iron deficiency anemia and increased eosinophils--rule out IBD, celiac disease, eosinophilic gastroenteritis, neoplasm.  Possible bacterial overgrowth, pancreatic insufficiency.  The iron deficiency anemia could also be from the partial gastrectomy. 2.  Status post partial gastrectomy for GIST 2020, on Gleevec.  On Prevacid x years. 3.  Status post lung surgery for ganglioneuroblastoma 1984; restrictive lung disease. 4.  History of scoliosis, multiple spinal surgeries. 5.  Chronically underweight.  Plan: 1.  Extensive medical records have been reviewed. 2.  Bloodwork drawn by me this afternoon.  He will call for test results next week. 3.  Ordinarily, with change in bowels, weight loss, and iron deficiency anemia, diagnostic panendoscopy would be advisable.  However, given restrictive lung disease and other comorbidities, I am concerned about the potential risks of undergoing anesthesia.  If endoscopic evaluation is contemplated, he would need further evaluation/input from Pulmonary before proceeding. 4.  He will retrieve copy of latest CT scan for my review. 5.  Return in 2-3 months for further discussion.

## 2024-08-01 LAB
ALBUMIN SERPL ELPH-MCNC: 4.8 G/DL
ALP BLD-CCNC: 89 U/L
ALT SERPL-CCNC: 18 U/L
AMYLASE/CREAT SERPL: 56 U/L
ANION GAP SERPL CALC-SCNC: 14 MMOL/L
AST SERPL-CCNC: 22 U/L
BASOPHILS # BLD AUTO: 0.05 K/UL
BASOPHILS NFR BLD AUTO: 0.8 %
BILIRUB SERPL-MCNC: 0.4 MG/DL
BUN SERPL-MCNC: 9 MG/DL
CALCIUM SERPL-MCNC: 10 MG/DL
CHLORIDE SERPL-SCNC: 93 MMOL/L
CO2 SERPL-SCNC: 32 MMOL/L
CREAT SERPL-MCNC: 0.63 MG/DL
CRP SERPL-MCNC: <3 MG/L
EGFR: 123 ML/MIN/1.73M2
EOSINOPHIL # BLD AUTO: 0.91 K/UL
EOSINOPHIL # BLD MANUAL: 910 /UL
EOSINOPHIL NFR BLD AUTO: 13.7 %
ERYTHROCYTE [SEDIMENTATION RATE] IN BLOOD BY WESTERGREN METHOD: 2 MM/HR
GLUCOSE SERPL-MCNC: 96 MG/DL
HCT VFR BLD CALC: 39.8 %
HGB BLD-MCNC: 12.6 G/DL
IGA SER QL IEP: 116 MG/DL
IMM GRANULOCYTES NFR BLD AUTO: 0.3 %
LPL SERPL-CCNC: 33 U/L
LYMPHOCYTES # BLD AUTO: 1.15 K/UL
LYMPHOCYTES NFR BLD AUTO: 17.3 %
MAN DIFF?: NORMAL
MCHC RBC-ENTMCNC: 29.5 PG
MCHC RBC-ENTMCNC: 31.7 GM/DL
MCV RBC AUTO: 93.2 FL
MONOCYTES # BLD AUTO: 0.53 K/UL
MONOCYTES NFR BLD AUTO: 8 %
NEUTROPHILS # BLD AUTO: 3.99 K/UL
NEUTROPHILS NFR BLD AUTO: 59.9 %
PLATELET # BLD AUTO: 283 K/UL
POTASSIUM SERPL-SCNC: 4.7 MMOL/L
PROT SERPL-MCNC: 7 G/DL
RBC # BLD: 4.27 M/UL
RBC # FLD: 14.7 %
SODIUM SERPL-SCNC: 139 MMOL/L
WBC # FLD AUTO: 6.65 K/UL

## 2024-08-02 ENCOUNTER — NON-APPOINTMENT (OUTPATIENT)
Age: 44
End: 2024-08-02

## 2024-08-02 LAB
ENDOMYSIUM IGA SER QL: NEGATIVE
ENDOMYSIUM IGA TITR SER: NORMAL

## 2024-08-05 LAB
BAKER'S YEAST AB QL: <5 UNITS
BAKER'S YEAST IGA QL IA: <5 UNITS
BAKER'S YEAST IGA QN IA: NEGATIVE
BAKER'S YEAST IGG QN IA: NEGATIVE
GLIADIN IGA SER QL: 0.3 U/ML
GLIADIN IGG SER QL: <0.4 U/ML
GLIADIN PEPTIDE IGA SER-ACNC: NEGATIVE
GLIADIN PEPTIDE IGG SER-ACNC: NEGATIVE
TTG IGA SER IA-ACNC: <0.5 U/ML
TTG IGA SER-ACNC: NEGATIVE
TTG IGG SER IA-ACNC: <0.8 U/ML
TTG IGG SER IA-ACNC: NEGATIVE

## 2024-08-07 ENCOUNTER — NON-APPOINTMENT (OUTPATIENT)
Age: 44
End: 2024-08-07

## 2024-08-07 LAB — VIT B6 SERPL-MCNC: 15.4 UG/L

## 2024-08-12 ENCOUNTER — NON-APPOINTMENT (OUTPATIENT)
Age: 44
End: 2024-08-12

## 2024-09-30 ENCOUNTER — APPOINTMENT (OUTPATIENT)
Dept: INTERNAL MEDICINE | Facility: CLINIC | Age: 44
End: 2024-09-30
Payer: MEDICARE

## 2024-09-30 VITALS
WEIGHT: 102 LBS | OXYGEN SATURATION: 95 % | RESPIRATION RATE: 18 BRPM | DIASTOLIC BLOOD PRESSURE: 82 MMHG | BODY MASS INDEX: 17.42 KG/M2 | HEART RATE: 101 BPM | HEIGHT: 64 IN | SYSTOLIC BLOOD PRESSURE: 119 MMHG

## 2024-09-30 DIAGNOSIS — D50.9 IRON DEFICIENCY ANEMIA, UNSPECIFIED: ICD-10-CM

## 2024-09-30 PROCEDURE — 99214 OFFICE O/P EST MOD 30 MIN: CPT

## 2024-09-30 PROCEDURE — 36415 COLL VENOUS BLD VENIPUNCTURE: CPT

## 2024-09-30 PROCEDURE — G2211 COMPLEX E/M VISIT ADD ON: CPT

## 2024-10-01 ENCOUNTER — CLINICAL ADVICE (OUTPATIENT)
Age: 44
End: 2024-10-01

## 2024-10-01 LAB
ALBUMIN SERPL ELPH-MCNC: 4.6 G/DL
ALP BLD-CCNC: 83 U/L
ALT SERPL-CCNC: 16 U/L
ANION GAP SERPL CALC-SCNC: 13 MMOL/L
AST SERPL-CCNC: 19 U/L
BASOPHILS # BLD AUTO: 0.02 K/UL
BASOPHILS NFR BLD AUTO: 0.3 %
BILIRUB DIRECT SERPL-MCNC: 0.1 MG/DL
BILIRUB INDIRECT SERPL-MCNC: 0.2 MG/DL
BILIRUB SERPL-MCNC: 0.3 MG/DL
BUN SERPL-MCNC: 10 MG/DL
CALCIUM SERPL-MCNC: 9.7 MG/DL
CHLORIDE SERPL-SCNC: 90 MMOL/L
CHOLEST SERPL-MCNC: 207 MG/DL
CO2 SERPL-SCNC: 33 MMOL/L
CREAT SERPL-MCNC: 0.52 MG/DL
EGFR: 130 ML/MIN/1.73M2
EOSINOPHIL # BLD AUTO: 0.12 K/UL
EOSINOPHIL NFR BLD AUTO: 1.5 %
ESTIMATED AVERAGE GLUCOSE: 108 MG/DL
GLUCOSE SERPL-MCNC: 92 MG/DL
HBA1C MFR BLD HPLC: 5.4 %
HCT VFR BLD CALC: 41.8 %
HDLC SERPL-MCNC: 53 MG/DL
HGB BLD-MCNC: 13 G/DL
IMM GRANULOCYTES NFR BLD AUTO: 0.4 %
LDLC SERPL CALC-MCNC: 139 MG/DL
LYMPHOCYTES # BLD AUTO: 0.93 K/UL
LYMPHOCYTES NFR BLD AUTO: 11.9 %
MAN DIFF?: NORMAL
MCHC RBC-ENTMCNC: 29.8 PG
MCHC RBC-ENTMCNC: 31.1 GM/DL
MCV RBC AUTO: 95.9 FL
MONOCYTES # BLD AUTO: 0.67 K/UL
MONOCYTES NFR BLD AUTO: 8.6 %
NEUTROPHILS # BLD AUTO: 6.06 K/UL
NEUTROPHILS NFR BLD AUTO: 77.3 %
NONHDLC SERPL-MCNC: 155 MG/DL
PLATELET # BLD AUTO: 388 K/UL
POTASSIUM SERPL-SCNC: 4.2 MMOL/L
PROT SERPL-MCNC: 6.7 G/DL
RBC # BLD: 4.36 M/UL
RBC # FLD: 13.9 %
SODIUM SERPL-SCNC: 136 MMOL/L
TRIGL SERPL-MCNC: 87 MG/DL
TSH SERPL-ACNC: 1.14 UIU/ML
WBC # FLD AUTO: 7.83 K/UL

## 2024-10-01 NOTE — HISTORY OF PRESENT ILLNESS
[de-identified] : 43 year old male in wheelchair with h/o h/o multiple back surgeries, GIST tumor, restrictive lung disease presents for followup  seen by gastro, note appreciated.  States symptoms have somewhat subsided but still suffers from constipation at times.  Reluctant to start stool softeners consistently as was advised by spine specialist to be aware for overmedicating causing diarrhea. Baseline bowel regimen every 5 days.  Does feel some left lower quadrants discomfort at this time.  Plans to take laxative today  HPI 5/2024 having issues with constipation, normally goes once weekly.  on pregabalin daily.  endorses doesn't drink alot of water throughout the day.  this causes decreased appetite, eats ~2 meals a day.   [place] [facility] [place] like [place] second opinion as not interested in completing a colonoscopy as recommended.  [place] [facility] [place] acute etiology.    1/9/2023- going to aqua therapy which helps somewhat with back pain  follows with pain management  kidney lesion noted on CT s/p bx 6/2022.  bx was unsatisfactory.  f/u CT shows smaller in size.    urinalysis shows some protein.  [place] [facility] [place] and [place]inalysis was normal last week.    HPI: Patient has extensive past medical history.   Was diagnosed with severe scoliosis as a child which required surgery.  Was doing well until recently screws and back became loose and had to be revised for also further complication of developing diaphragmatic paralysis, restrictive lung disease.  recently had another revision with Dr. Gibbs, ortho spine.  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   recently has developed clonus in right leg.  Decreased strength, which she has been using wheelchair to get around.    restrictive lung disease, home o2 at night, Bipap  pulmonologist Dr. Neymar Chavez

## 2024-10-01 NOTE — PHYSICAL EXAM
[Cachectic] : cachexia was observed [Normal] : normal rate, regular rhythm, normal S1 and S2 and no murmur heard [Pedal Pulses Present] : the pedal pulses are present [No Edema] : there was no peripheral edema [No Extremity Clubbing/Cyanosis] : no extremity clubbing/cyanosis [Soft] : abdomen soft [Non Tender] : non-tender [Non-distended] : non-distended [No HSM] : no HSM [Normal Posterior Cervical Nodes] : no posterior cervical lymphadenopathy [Normal Anterior Cervical Nodes] : no anterior cervical lymphadenopathy [No Rash] : no rash [Normal Affect] : the affect was normal [Normal Mood] : the mood was normal [de-identified] : Seen in wheelchair [de-identified] : Lordotic [de-identified] : Increased muscle atrophy noted in all extremities [de-identified] : Weakness appreciated on right lower extremity

## 2024-10-01 NOTE — PHYSICAL EXAM
[Cachectic] : cachexia was observed [Normal] : normal rate, regular rhythm, normal S1 and S2 and no murmur heard [Pedal Pulses Present] : the pedal pulses are present [No Edema] : there was no peripheral edema [No Extremity Clubbing/Cyanosis] : no extremity clubbing/cyanosis [Soft] : abdomen soft [Non Tender] : non-tender [Non-distended] : non-distended [No HSM] : no HSM [Normal Posterior Cervical Nodes] : no posterior cervical lymphadenopathy [Normal Anterior Cervical Nodes] : no anterior cervical lymphadenopathy [No Rash] : no rash [Normal Affect] : the affect was normal [Normal Mood] : the mood was normal [de-identified] : Seen in wheelchair [de-identified] : Increased muscle atrophy noted in all extremities [de-identified] : Lordotic [de-identified] : Weakness appreciated on right lower extremity

## 2024-10-01 NOTE — ASSESSMENT
[FreeTextEntry1] : //  Constipation, multifactorial including decrease fluids, pain meds annd wheelchair bound -agree with GI to add fiber in diet  -increase water hydration  -advised to participate in some exercise to help facilitate BM (was doing physical therapy in the past that was helping  -Psyllium (Metamucil) or Methylcellulose (Citrucel) are over-the-counter supplements which may be added to meet dietary fiber goals.  -Miralax, daily  -Do not ignore the urge to have a bowel movement as this can lead to a decrease in overall urge, resulting in worsening constipation.  -f/u with GI for possible EGD and colonoscopy  Was diagnosed with severe scoliosis as a child which required surgery.  Was doing well until recently screws and back became loose and had to be revised for also further complication of developing diaphragmatic paralysis, restrictive lung disease.  recently had another revision with Dr. Gibbs, ortho spine.  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  recently has developed clonus in right leg.  Decreased strength, which she has been using wheelchair to get around.   -f/u as per [facility]   chronic back pain  -recommended acupuncture, cont PT with aqua   restrictive lung disease, home 1L o2 at night, Bipap, pulmonologist Dr. Neymar Chavez-stable  Immunizations -Flu vaccine -declined  -Covid vaccine   Preventative screening  -advised to get colonoscopy for colon cancer screening -discuss with GI -will check PSA for prostate cancer screening -labs drawn

## 2024-10-01 NOTE — HEALTH RISK ASSESSMENT
[Good] : ~his/her~  mood as  good [No] : In the past 12 months have you used drugs other than those required for medical reasons? No [No falls in past year] : Patient reported no falls in the past year [0] : 2) Feeling down, depressed, or hopeless: Not at all (0) [PHQ-2 Negative - No further assessment needed] : PHQ-2 Negative - No further assessment needed [[id number]] : 0 [Never] : Never [Change in mental status noted] : No change in mental status noted [Fully functional (bathing, dressing, toileting, transferring, walking, feeding)] : Fully functional (bathing, dressing, toileting, transferring, walking, feeding) [Fully functional (using the telephone, shopping, preparing meals, housekeeping, doing laundry, using] : Fully functional and needs no help or supervision to perform IADLs (using the telephone, shopping, preparing meals, housekeeping, doing laundry, using transportation, managing medications and managing finances) [Reports changes in hearing] : Reports no changes in hearing [Reports changes in vision] : Reports no changes in vision

## 2024-10-01 NOTE — HISTORY OF PRESENT ILLNESS
[de-identified] : 43 year old male in wheelchair with h/o h/o multiple back surgeries, GIST tumor, restrictive lung disease presents for followup  seen by gastro, note appreciated.  States symptoms have somewhat subsided but still suffers from constipation at times.  Reluctant to start stool softeners consistently as was advised by spine specialist to be aware for overmedicating causing diarrhea. Baseline bowel regimen every 5 days.  Does feel some left lower quadrants discomfort at this time.  Plans to take laxative today  HPI 5/2024 having issues with constipation, normally goes once weekly.  on pregabalin daily.  endorses doesn't drink alot of water throughout the day.  this causes decreased appetite, eats ~2 meals a day.   [place] [facility] [place] like [place] second opinion as not interested in completing a colonoscopy as recommended.  [place] [facility] [place].    1/9/2023- going to aqua therapy which helps somewhat with back pain  follows with pain management  kidney lesion noted on CT s/p bx 6/2022.  bx was unsatisfactory.  f/u CT shows smaller in size.    urinalysis shows some protein.  [place] [facility] [place] and [place]inalysis was normal last week.    HPI: Patient has extensive past medical history.   Was diagnosed with severe scoliosis as a child which required surgery.  Was doing well until recently screws and back became loose and had to be revised for also further complication of developing diaphragmatic paralysis, restrictive lung disease.  recently had another revision with Dr. Gibbs, ortho spine.  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   recently has developed clonus in right leg.  Decreased strength, which she has been using wheelchair to get around.    restrictive lung disease, home o2 at night, Bipap  pulmonologist Dr. Neymar Chavez

## 2024-10-01 NOTE — HEALTH RISK ASSESSMENT
[Good] : ~his/her~  mood as  good [No] : In the past 12 months have you used drugs other than those required for medical reasons? No [No falls in past year] : Patient reported no falls in the past year [0] : 2) Feeling down, depressed, or hopeless: Not at all (0) [PHQ-2 Negative - No further assessment needed] : PHQ-2 Negative - No further assessment needed [[id number]] : 0 [Never] : Never [Change in mental status noted] : No change in mental status noted [Fully functional (bathing, dressing, toileting, transferring, walking, feeding)] : Fully functional (bathing, dressing, toileting, transferring, walking, feeding) [Fully functional (using the telephone, shopping, preparing meals, housekeeping, doing laundry, using] : Fully functional and needs no help or supervision to perform IADLs (using the telephone, shopping, preparing meals, housekeeping, doing laundry, using transportation, managing medications and managing finances) [Reports changes in hearing] : Reports no changes in hearing [Reports changes in vision] : Reports no changes in vision

## 2024-10-01 NOTE — HISTORY OF PRESENT ILLNESS
[de-identified] : 43 year old male in wheelchair with h/o h/o multiple back surgeries, GIST tumor, restrictive lung disease presents for followup  seen by gastro, note appreciated.  States symptoms have somewhat subsided but still suffers from constipation at times.  Reluctant to start stool softeners consistently as was advised by spine specialist to be aware for overmedicating causing diarrhea. Baseline bowel regimen every 5 days.  Does feel some left lower quadrants discomfort at this time.  Plans to take laxative today  HPI 5/2024 having issues with constipation, normally goes once weekly.  on pregabalin daily.  endorses doesn't drink alot of water throughout the day.  this causes decreased appetite, eats ~2 meals a day.   seen by GI via tele with [facility] [place] but would like a second opinion as not interested in completing a colonoscopy as recommended.  gets Pan-CT annually with [facility] [place] which shows no acute etiology.    1/9/2023- going to aqua therapy which helps somewhat with back pain  follows with pain management  kidney lesion noted on CT s/p bx 6/2022.  bx was unsatisfactory.  f/u CT shows smaller in size.    urinalysis shows some protein.  was seen at [facility] [place] and labs and urinalysis was normal last week.    HPI: Patient has extensive past medical history.   Was diagnosed with severe scoliosis as a child which required surgery.  Was doing well until recently screws and back became loose and had to be revised for also further complication of developing diaphragmatic paralysis, restrictive lung disease.  recently had another revision with Dr. Gibbs, ortho spine.  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   recently has developed clonus in right leg.  Decreased strength, which she has been using wheelchair to get around.    restrictive lung disease, home o2 at night, Bipap  pulmonologist Dr. Neymar Chavez

## 2024-10-01 NOTE — PHYSICAL EXAM
[Cachectic] : cachexia was observed [Normal] : normal rate, regular rhythm, normal S1 and S2 and no murmur heard [Pedal Pulses Present] : the pedal pulses are present [No Edema] : there was no peripheral edema [No Extremity Clubbing/Cyanosis] : no extremity clubbing/cyanosis [Soft] : abdomen soft [Non Tender] : non-tender [Non-distended] : non-distended [No HSM] : no HSM [Normal Posterior Cervical Nodes] : no posterior cervical lymphadenopathy [Normal Anterior Cervical Nodes] : no anterior cervical lymphadenopathy [No Rash] : no rash [Normal Affect] : the affect was normal [Normal Mood] : the mood was normal [de-identified] : Seen in wheelchair [de-identified] : Increased muscle atrophy noted in all extremities [de-identified] : Lordotic [de-identified] : Weakness appreciated on right lower extremity

## 2024-10-01 NOTE — HEALTH RISK ASSESSMENT
[Good] : ~his/her~  mood as  good [No] : In the past 12 months have you used drugs other than those required for medical reasons? No [No falls in past year] : Patient reported no falls in the past year [0] : 2) Feeling down, depressed, or hopeless: Not at all (0) [PHQ-2 Negative - No further assessment needed] : PHQ-2 Negative - No further assessment needed [[id number]] : 0 [Never] : Never [Change in mental status noted] : No change in mental status noted [Fully functional (bathing, dressing, toileting, transferring, walking, feeding)] : Fully functional (bathing, dressing, toileting, transferring, walking, feeding) [Fully functional (using the telephone, shopping, preparing meals, housekeeping, doing laundry, using] : Fully functional and needs no help or supervision to perform IADLs (using the telephone, shopping, preparing meals, housekeeping, doing laundry, using transportation, managing medications and managing finances) [Reports changes in hearing] : Reports no changes in hearing [Reports changes in vision] : Reports no changes in vision

## 2024-10-01 NOTE — REVIEW OF SYSTEMS
[Constipation] : constipation [Muscle Weakness] : muscle weakness [Skin Rash] : skin rash [Unsteady Walk] : ataxia [Negative] : Genitourinary

## 2024-10-29 ENCOUNTER — APPOINTMENT (OUTPATIENT)
Dept: GASTROENTEROLOGY | Facility: CLINIC | Age: 44
End: 2024-10-29
Payer: MEDICARE

## 2024-10-29 VITALS
DIASTOLIC BLOOD PRESSURE: 69 MMHG | BODY MASS INDEX: 17.42 KG/M2 | WEIGHT: 102 LBS | SYSTOLIC BLOOD PRESSURE: 113 MMHG | HEIGHT: 64 IN | HEART RATE: 90 BPM

## 2024-10-29 DIAGNOSIS — R10.9 UNSPECIFIED ABDOMINAL PAIN: ICD-10-CM

## 2024-10-29 DIAGNOSIS — C49.A0 GASTROINTESTINAL STOMACL TUMOR,UNSPECIFIED SITE: ICD-10-CM

## 2024-10-29 DIAGNOSIS — R19.4 CHANGE IN BOWEL HABIT: ICD-10-CM

## 2024-10-29 DIAGNOSIS — J98.4 OTHER DISORDERS OF LUNG: ICD-10-CM

## 2024-10-29 DIAGNOSIS — R19.8 OTHER SPECIFIED SYMPTOMS AND SIGNS INVOLVING THE DIGESTIVE SYSTEM AND ABDOMEN: ICD-10-CM

## 2024-10-29 DIAGNOSIS — E78.00 PURE HYPERCHOLESTEROLEMIA, UNSPECIFIED: ICD-10-CM

## 2024-10-29 PROCEDURE — 99215 OFFICE O/P EST HI 40 MIN: CPT

## 2025-05-06 ENCOUNTER — APPOINTMENT (OUTPATIENT)
Dept: INTERNAL MEDICINE | Facility: CLINIC | Age: 45
End: 2025-05-06
Payer: MEDICARE

## 2025-05-06 VITALS
OXYGEN SATURATION: 97 % | HEIGHT: 64 IN | SYSTOLIC BLOOD PRESSURE: 126 MMHG | BODY MASS INDEX: 17.42 KG/M2 | WEIGHT: 102 LBS | HEART RATE: 90 BPM | DIASTOLIC BLOOD PRESSURE: 78 MMHG | RESPIRATION RATE: 16 BRPM

## 2025-05-06 DIAGNOSIS — Z00.00 ENCOUNTER FOR GENERAL ADULT MEDICAL EXAMINATION W/OUT ABNORMAL FINDINGS: ICD-10-CM

## 2025-05-06 DIAGNOSIS — E78.00 PURE HYPERCHOLESTEROLEMIA, UNSPECIFIED: ICD-10-CM

## 2025-05-06 PROCEDURE — G0439: CPT

## 2025-05-06 PROCEDURE — 36415 COLL VENOUS BLD VENIPUNCTURE: CPT

## 2025-05-07 ENCOUNTER — CLINICAL ADVICE (OUTPATIENT)
Age: 45
End: 2025-05-07

## 2025-05-07 LAB
25(OH)D3 SERPL-MCNC: 37 NG/ML
ALBUMIN SERPL ELPH-MCNC: 4.7 G/DL
ALP BLD-CCNC: 93 U/L
ALT SERPL-CCNC: 22 U/L
ANION GAP SERPL CALC-SCNC: 17 MMOL/L
AST SERPL-CCNC: 29 U/L
BASOPHILS # BLD AUTO: 0.03 K/UL
BASOPHILS NFR BLD AUTO: 0.6 %
BILIRUB DIRECT SERPL-MCNC: 0.1 MG/DL
BILIRUB INDIRECT SERPL-MCNC: 0.2 MG/DL
BILIRUB SERPL-MCNC: 0.3 MG/DL
BUN SERPL-MCNC: 9 MG/DL
CALCIUM SERPL-MCNC: 9.5 MG/DL
CHLORIDE SERPL-SCNC: 91 MMOL/L
CHOLEST SERPL-MCNC: 181 MG/DL
CO2 SERPL-SCNC: 27 MMOL/L
CREAT SERPL-MCNC: 0.53 MG/DL
EGFRCR SERPLBLD CKD-EPI 2021: 128 ML/MIN/1.73M2
EOSINOPHIL # BLD AUTO: 0.22 K/UL
EOSINOPHIL NFR BLD AUTO: 4.7 %
ESTIMATED AVERAGE GLUCOSE: 103 MG/DL
FERRITIN SERPL-MCNC: 21 NG/ML
FOLATE SERPL-MCNC: 14.9 NG/ML
GLUCOSE SERPL-MCNC: 87 MG/DL
HBA1C MFR BLD HPLC: 5.2 %
HCT VFR BLD CALC: 41.1 %
HDLC SERPL-MCNC: 49 MG/DL
HGB BLD-MCNC: 13 G/DL
IMM GRANULOCYTES NFR BLD AUTO: 0.2 %
IRON SATN MFR SERPL: 13 %
IRON SERPL-MCNC: 49 UG/DL
LDLC SERPL-MCNC: 116 MG/DL
LYMPHOCYTES # BLD AUTO: 0.94 K/UL
LYMPHOCYTES NFR BLD AUTO: 19.9 %
MAN DIFF?: NORMAL
MCHC RBC-ENTMCNC: 30 PG
MCHC RBC-ENTMCNC: 31.6 G/DL
MCV RBC AUTO: 94.7 FL
MONOCYTES # BLD AUTO: 0.46 K/UL
MONOCYTES NFR BLD AUTO: 9.7 %
NEUTROPHILS # BLD AUTO: 3.06 K/UL
NEUTROPHILS NFR BLD AUTO: 64.9 %
NONHDLC SERPL-MCNC: 133 MG/DL
PLATELET # BLD AUTO: 162 K/UL
POTASSIUM SERPL-SCNC: 4.5 MMOL/L
PROT SERPL-MCNC: 7 G/DL
PSA SERPL-MCNC: 1.23 NG/ML
RBC # BLD: 4.34 M/UL
RBC # FLD: 13.8 %
SODIUM SERPL-SCNC: 134 MMOL/L
TIBC SERPL-MCNC: 372 UG/DL
TRIGL SERPL-MCNC: 87 MG/DL
TSH SERPL-ACNC: 1.11 UIU/ML
UIBC SERPL-MCNC: 323 UG/DL
VIT B12 SERPL-MCNC: 389 PG/ML
WBC # FLD AUTO: 4.72 K/UL